# Patient Record
Sex: MALE | Race: WHITE
[De-identification: names, ages, dates, MRNs, and addresses within clinical notes are randomized per-mention and may not be internally consistent; named-entity substitution may affect disease eponyms.]

---

## 2021-02-17 PROBLEM — Z00.00 ENCOUNTER FOR PREVENTIVE HEALTH EXAMINATION: Status: ACTIVE | Noted: 2021-02-17

## 2021-03-05 ENCOUNTER — APPOINTMENT (OUTPATIENT)
Dept: COLORECTAL SURGERY | Facility: CLINIC | Age: 81
End: 2021-03-05

## 2022-07-12 ENCOUNTER — APPOINTMENT (OUTPATIENT)
Dept: ORTHOPEDIC SURGERY | Facility: CLINIC | Age: 82
End: 2022-07-12

## 2022-07-12 DIAGNOSIS — M25.562 PAIN IN LEFT KNEE: ICD-10-CM

## 2022-07-12 DIAGNOSIS — M17.12 UNILATERAL PRIMARY OSTEOARTHRITIS, LEFT KNEE: ICD-10-CM

## 2022-07-12 PROCEDURE — 99203 OFFICE O/P NEW LOW 30 MIN: CPT

## 2022-07-12 PROCEDURE — 73562 X-RAY EXAM OF KNEE 3: CPT | Mod: LT

## 2022-07-12 NOTE — PHYSICAL EXAM
[NL (0)] : extension 0 degrees [5___] : hamstring 5[unfilled]/5 [Equivocal] : equivocal Pablo [] : no atrophy [Left] : left knee [AP] : anteroposterior [Lateral] : lateral [Loa] : skyline [There are no fractures, subluxations or dislocations. No significant abnormalities are seen] : There are no fractures, subluxations or dislocations. No significant abnormalities are seen [Degenerative change] : Degenerative change [Mild tricompartmental OA medial narrowing] : Mild tricompartmental OA medial narrowing [[id number]] : flexion 120 degrees

## 2022-07-12 NOTE — HISTORY OF PRESENT ILLNESS
[Constant] : constant [de-identified] : C/o pain in the left knee. Problem started about 2 weeks ago. Patient has a history of meniscus surgery in 1991. He complains of pain and swelling. Patient complains of weakness and instability. Patient states he has constant pain, worse when lying down or standing. Patient icing as needed.

## 2022-07-12 NOTE — DISCUSSION/SUMMARY
[de-identified] : reviewed findings, anatomy and pathology  discussed and pt understands. questions answered, pt left pleased\par discuss possible adverse reaction to meds , recommend discuss with PMD

## 2023-06-21 ENCOUNTER — OFFICE (OUTPATIENT)
Dept: URBAN - METROPOLITAN AREA CLINIC 97 | Facility: CLINIC | Age: 83
Setting detail: OPHTHALMOLOGY
End: 2023-06-21
Payer: MEDICARE

## 2023-06-21 DIAGNOSIS — T15.02XA: ICD-10-CM

## 2023-06-21 DIAGNOSIS — H00.14: ICD-10-CM

## 2023-06-21 DIAGNOSIS — H11.32: ICD-10-CM

## 2023-06-21 PROCEDURE — 99213 OFFICE O/P EST LOW 20 MIN: CPT | Performed by: OPHTHALMOLOGY

## 2023-06-21 PROCEDURE — 65222 REMOVE FOREIGN BODY FROM EYE: CPT | Performed by: OPHTHALMOLOGY

## 2023-06-21 ASSESSMENT — REFRACTION_MANIFEST
OS_SPHERE: -1.00
OS_CYLINDER: +1.00
OS_VA1: 20/40+
OS_VA2: 20/20(J1+)
OD_VA2: 20/20(J1+)
OU_VA: 20/40+1
OD_SPHERE: -2.75
OD_CYLINDER: +1.25
OS_AXIS: 180
OS_AXIS: 090
OD_CYLINDER: -0.75
OS_SPHERE: -2.50
OS_CYLINDER: -0.75
OD_ADD: +2.25
OD_SPHERE: -3.00
OS_ADD: +2.25
OD_VA1: 20/50-
OD_AXIS: 090
OD_AXIS: 005

## 2023-06-21 ASSESSMENT — REFRACTION_CURRENTRX
OD_OVR_VA: 20/
OD_CYLINDER: +1.50
OS_SPHERE: -1.75
OD_VPRISM_DIRECTION: SV
OS_OVR_VA: 20/
OS_AXIS: 11
OD_CYLINDER: +1.25
OD_AXIS: 2
OS_VPRISM_DIRECTION: SV
OS_SPHERE: -3.00
OD_VPRISM_DIRECTION: SV
OD_AXIS: 1
OD_SPHERE: -2.50
OD_OVR_VA: 20/
OD_SPHERE: -3.25
OS_OVR_VA: 20/
OS_VPRISM_DIRECTION: SV
OS_AXIS: 177
OS_CYLINDER: +1.00
OS_CYLINDER: +1.25

## 2023-06-21 ASSESSMENT — KERATOMETRY
OD_AXISANGLE_DEGREES: 090
OS_K1POWER_DIOPTERS: 43.75
OD_K1POWER_DIOPTERS: 44.25
OS_K2POWER_DIOPTERS: 44.00
OD_K2POWER_DIOPTERS: 44.25
OS_AXISANGLE_DEGREES: 009

## 2023-06-21 ASSESSMENT — CORNEAL TRAUMA - FOREIGN BODY: OS_FOREIGNBODY: NON METALLIC PRESENT

## 2023-06-21 ASSESSMENT — AXIALLENGTH_DERIVED
OD_AL: 24.6792
OS_AL: 23.9967
OD_AL: 24.3117
OD_AL: 24.1576
OS_AL: 24.2513
OS_AL: 23.9967

## 2023-06-21 ASSESSMENT — REFRACTION_AUTOREFRACTION
OS_CYLINDER: -1.25
OS_AXIS: 087
OD_CYLINDER: -1.00
OD_AXIS: 090
OS_SPHERE: -0.75
OD_SPHERE: -2.00

## 2023-06-21 ASSESSMENT — SPHEQUIV_DERIVED
OD_SPHEQUIV: -2.5
OS_SPHEQUIV: -2
OD_SPHEQUIV: -3.375
OS_SPHEQUIV: -1.375
OD_SPHEQUIV: -2.125
OS_SPHEQUIV: -1.375

## 2023-06-21 ASSESSMENT — CORNEAL TRAUMA - ABRASION: OS_ABRASION: ABSENT

## 2023-06-21 ASSESSMENT — VISUAL ACUITY
OS_BCVA: 20/25+2
OD_BCVA: 20/25-

## 2023-06-21 ASSESSMENT — TONOMETRY
OD_IOP_MMHG: 14
OS_IOP_MMHG: 12

## 2023-06-21 ASSESSMENT — CONFRONTATIONAL VISUAL FIELD TEST (CVF)
OD_FINDINGS: FULL
OS_FINDINGS: FULL

## 2023-06-23 ENCOUNTER — OFFICE (OUTPATIENT)
Dept: URBAN - METROPOLITAN AREA CLINIC 8 | Facility: CLINIC | Age: 83
Setting detail: OPHTHALMOLOGY
End: 2023-06-23
Payer: MEDICARE

## 2023-06-23 ENCOUNTER — RX ONLY (RX ONLY)
Age: 83
End: 2023-06-23

## 2023-06-23 DIAGNOSIS — H00.14: ICD-10-CM

## 2023-06-23 DIAGNOSIS — H01.004: ICD-10-CM

## 2023-06-23 DIAGNOSIS — H11.32: ICD-10-CM

## 2023-06-23 DIAGNOSIS — H01.001: ICD-10-CM

## 2023-06-23 PROCEDURE — 99213 OFFICE O/P EST LOW 20 MIN: CPT

## 2023-06-23 ASSESSMENT — TONOMETRY
OD_IOP_MMHG: 15
OS_IOP_MMHG: 15

## 2023-06-23 ASSESSMENT — SPHEQUIV_DERIVED
OD_SPHEQUIV: -3.375
OD_SPHEQUIV: -2.125
OD_SPHEQUIV: -2.5
OS_SPHEQUIV: -2
OS_SPHEQUIV: -1.375
OS_SPHEQUIV: -1.375

## 2023-06-23 ASSESSMENT — REFRACTION_CURRENTRX
OS_AXIS: 177
OD_CYLINDER: +1.25
OS_SPHERE: -1.75
OS_CYLINDER: +1.25
OS_VPRISM_DIRECTION: SV
OD_VPRISM_DIRECTION: SV
OS_SPHERE: -3.00
OD_CYLINDER: +1.50
OD_OVR_VA: 20/
OS_VPRISM_DIRECTION: SV
OD_SPHERE: -3.25
OD_AXIS: 2
OD_SPHERE: -2.50
OD_AXIS: 1
OS_OVR_VA: 20/
OS_CYLINDER: +1.00
OD_OVR_VA: 20/
OS_OVR_VA: 20/
OS_AXIS: 11
OD_VPRISM_DIRECTION: SV

## 2023-06-23 ASSESSMENT — REFRACTION_MANIFEST
OS_VA2: 20/20(J1+)
OU_VA: 20/40+1
OD_AXIS: 005
OD_VA1: 20/50-
OS_CYLINDER: +1.00
OD_AXIS: 090
OS_ADD: +2.25
OD_CYLINDER: -0.75
OS_VA1: 20/40+
OS_CYLINDER: -0.75
OD_SPHERE: -3.00
OD_SPHERE: -2.75
OS_SPHERE: -2.50
OD_ADD: +2.25
OD_CYLINDER: +1.25
OS_SPHERE: -1.00
OS_AXIS: 090
OS_AXIS: 180
OD_VA2: 20/20(J1+)

## 2023-06-23 ASSESSMENT — REFRACTION_AUTOREFRACTION
OS_AXIS: 087
OS_CYLINDER: -1.25
OD_SPHERE: -2.00
OD_AXIS: 090
OD_CYLINDER: -1.00
OS_SPHERE: -0.75

## 2023-06-23 ASSESSMENT — KERATOMETRY
OD_K2POWER_DIOPTERS: 44.25
OD_AXISANGLE_DEGREES: 090
OD_K1POWER_DIOPTERS: 44.25
OS_AXISANGLE_DEGREES: 009
OS_K2POWER_DIOPTERS: 44.00
OS_K1POWER_DIOPTERS: 43.75

## 2023-06-23 ASSESSMENT — VISUAL ACUITY
OD_BCVA: 20/30+2
OS_BCVA: 20/30+

## 2023-06-23 ASSESSMENT — AXIALLENGTH_DERIVED
OS_AL: 24.2513
OD_AL: 24.6792
OD_AL: 24.1576
OD_AL: 24.3117
OS_AL: 23.9967
OS_AL: 23.9967

## 2023-06-23 ASSESSMENT — LID EXAM ASSESSMENTS
OD_BLEPHARITIS: RUL 2+
OS_BLEPHARITIS: LUL 2+

## 2023-06-23 ASSESSMENT — CONFRONTATIONAL VISUAL FIELD TEST (CVF)
OD_FINDINGS: FULL
OS_FINDINGS: FULL

## 2023-07-19 ENCOUNTER — OFFICE (OUTPATIENT)
Dept: URBAN - METROPOLITAN AREA CLINIC 97 | Facility: CLINIC | Age: 83
Setting detail: OPHTHALMOLOGY
End: 2023-07-19
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
## 2023-07-19 PROCEDURE — NO SHOW FE NO SHOW FEE: Performed by: OPHTHALMOLOGY

## 2023-07-28 ENCOUNTER — RX ONLY (RX ONLY)
Age: 83
End: 2023-07-28

## 2023-07-28 ENCOUNTER — OFFICE (OUTPATIENT)
Dept: URBAN - METROPOLITAN AREA CLINIC 8 | Facility: CLINIC | Age: 83
Setting detail: OPHTHALMOLOGY
End: 2023-07-28
Payer: MEDICARE

## 2023-07-28 DIAGNOSIS — H01.004: ICD-10-CM

## 2023-07-28 DIAGNOSIS — H00.11: ICD-10-CM

## 2023-07-28 DIAGNOSIS — H01.001: ICD-10-CM

## 2023-07-28 PROCEDURE — 99213 OFFICE O/P EST LOW 20 MIN: CPT

## 2023-07-28 ASSESSMENT — REFRACTION_AUTOREFRACTION
OD_SPHERE: -2.00
OS_AXIS: 087
OS_CYLINDER: -1.25
OD_AXIS: 090
OD_CYLINDER: -1.00
OS_SPHERE: -0.75

## 2023-07-28 ASSESSMENT — VISUAL ACUITY
OD_BCVA: 20/30
OS_BCVA: 20/30

## 2023-07-28 ASSESSMENT — REFRACTION_CURRENTRX
OD_SPHERE: -2.50
OD_AXIS: 1
OD_OVR_VA: 20/
OS_AXIS: 177
OS_OVR_VA: 20/
OD_CYLINDER: +1.25
OD_CYLINDER: +1.50
OD_AXIS: 2
OS_CYLINDER: +1.00
OD_OVR_VA: 20/
OS_CYLINDER: +1.25
OD_VPRISM_DIRECTION: SV
OS_OVR_VA: 20/
OS_VPRISM_DIRECTION: SV
OD_VPRISM_DIRECTION: SV
OS_SPHERE: -1.75
OD_SPHERE: -3.25
OS_AXIS: 11
OS_SPHERE: -3.00
OS_VPRISM_DIRECTION: SV

## 2023-07-28 ASSESSMENT — REFRACTION_MANIFEST
OS_VA1: 20/40+
OS_VA2: 20/20(J1+)
OD_VA2: 20/20(J1+)
OD_AXIS: 090
OD_SPHERE: -3.00
OS_SPHERE: -1.00
OD_CYLINDER: +1.25
OD_CYLINDER: -0.75
OS_SPHERE: -2.50
OU_VA: 20/40+1
OS_ADD: +2.25
OD_VA1: 20/50-
OS_AXIS: 180
OD_ADD: +2.25
OS_CYLINDER: +1.00
OS_AXIS: 090
OD_AXIS: 005
OS_CYLINDER: -0.75
OD_SPHERE: -2.75

## 2023-07-28 ASSESSMENT — KERATOMETRY
OS_AXISANGLE_DEGREES: 009
OD_K2POWER_DIOPTERS: 44.25
OD_K1POWER_DIOPTERS: 44.25
OD_AXISANGLE_DEGREES: 090
OS_K2POWER_DIOPTERS: 44.00
OS_K1POWER_DIOPTERS: 43.75

## 2023-07-28 ASSESSMENT — SPHEQUIV_DERIVED
OD_SPHEQUIV: -2.5
OS_SPHEQUIV: -1.375
OD_SPHEQUIV: -3.375
OD_SPHEQUIV: -2.125
OS_SPHEQUIV: -1.375
OS_SPHEQUIV: -2

## 2023-07-28 ASSESSMENT — LID EXAM ASSESSMENTS
OD_BLEPHARITIS: RUL 2+
OS_BLEPHARITIS: LUL 2+

## 2023-07-28 ASSESSMENT — AXIALLENGTH_DERIVED
OS_AL: 24.2513
OD_AL: 24.3117
OS_AL: 23.9967
OD_AL: 24.6792
OS_AL: 23.9967
OD_AL: 24.1576

## 2023-07-28 ASSESSMENT — CONFRONTATIONAL VISUAL FIELD TEST (CVF)
OD_FINDINGS: FULL
OS_FINDINGS: FULL

## 2024-06-27 ENCOUNTER — APPOINTMENT (OUTPATIENT)
Dept: ORTHOPEDIC SURGERY | Facility: CLINIC | Age: 84
End: 2024-06-27
Payer: MEDICARE

## 2024-06-27 DIAGNOSIS — S46.212A STRAIN OF MUSCLE, FASCIA AND TENDON OF OTHER PARTS OF BICEPS, LEFT ARM, INITIAL ENCOUNTER: ICD-10-CM

## 2024-06-27 DIAGNOSIS — Z78.9 OTHER SPECIFIED HEALTH STATUS: ICD-10-CM

## 2024-06-27 DIAGNOSIS — S46.012A STRAIN OF MUSCLE(S) AND TENDON(S) OF THE ROTATOR CUFF OF LEFT SHOULDER, INITIAL ENCOUNTER: ICD-10-CM

## 2024-06-27 DIAGNOSIS — C67.9 MALIGNANT NEOPLASM OF BLADDER, UNSPECIFIED: ICD-10-CM

## 2024-06-27 PROCEDURE — 99213 OFFICE O/P EST LOW 20 MIN: CPT

## 2024-06-27 PROCEDURE — 73030 X-RAY EXAM OF SHOULDER: CPT | Mod: LT

## 2024-07-17 ENCOUNTER — OFFICE (OUTPATIENT)
Dept: URBAN - METROPOLITAN AREA CLINIC 8 | Facility: CLINIC | Age: 84
Setting detail: OPHTHALMOLOGY
End: 2024-07-17
Payer: MEDICARE

## 2024-07-17 VITALS — HEIGHT: 60 IN

## 2024-07-17 DIAGNOSIS — H01.001: ICD-10-CM

## 2024-07-17 DIAGNOSIS — H26.491: ICD-10-CM

## 2024-07-17 DIAGNOSIS — H16.223: ICD-10-CM

## 2024-07-17 DIAGNOSIS — Z96.1: ICD-10-CM

## 2024-07-17 DIAGNOSIS — H01.004: ICD-10-CM

## 2024-07-17 PROCEDURE — 99213 OFFICE O/P EST LOW 20 MIN: CPT | Performed by: OPHTHALMOLOGY

## 2024-07-17 ASSESSMENT — CONFRONTATIONAL VISUAL FIELD TEST (CVF)
OS_FINDINGS: FULL
OD_FINDINGS: FULL

## 2024-07-17 ASSESSMENT — LID EXAM ASSESSMENTS
OD_BLEPHARITIS: RUL T
OS_BLEPHARITIS: LUL T

## 2024-07-18 ENCOUNTER — APPOINTMENT (OUTPATIENT)
Dept: NEUROLOGY | Facility: CLINIC | Age: 84
End: 2024-07-18

## 2024-07-18 VITALS
HEIGHT: 65 IN | OXYGEN SATURATION: 97 % | SYSTOLIC BLOOD PRESSURE: 156 MMHG | WEIGHT: 151 LBS | HEART RATE: 79 BPM | DIASTOLIC BLOOD PRESSURE: 118 MMHG | BODY MASS INDEX: 25.16 KG/M2

## 2024-07-18 VITALS — SYSTOLIC BLOOD PRESSURE: 145 MMHG | DIASTOLIC BLOOD PRESSURE: 110 MMHG

## 2024-07-18 VITALS — SYSTOLIC BLOOD PRESSURE: 169 MMHG | DIASTOLIC BLOOD PRESSURE: 117 MMHG

## 2024-07-18 DIAGNOSIS — I63.9 CEREBRAL INFARCTION, UNSPECIFIED: ICD-10-CM

## 2024-07-18 DIAGNOSIS — Z78.9 OTHER SPECIFIED HEALTH STATUS: ICD-10-CM

## 2024-07-18 PROCEDURE — 99205 OFFICE O/P NEW HI 60 MIN: CPT

## 2024-07-19 RX ORDER — EZETIMIBE 10 MG/1
10 TABLET ORAL
Refills: 0 | Status: ACTIVE | COMMUNITY

## 2024-07-19 RX ORDER — SIMVASTATIN 10 MG/1
10 TABLET, FILM COATED ORAL
Refills: 0 | Status: ACTIVE | COMMUNITY

## 2024-07-19 RX ORDER — ASPIRIN 81 MG
81 TABLET, DELAYED RELEASE (ENTERIC COATED) ORAL
Refills: 0 | Status: ACTIVE | COMMUNITY

## 2024-07-19 RX ORDER — ALENDRONATE SODIUM 70 MG/1
70 TABLET ORAL
Refills: 0 | Status: ACTIVE | COMMUNITY

## 2024-07-19 RX ORDER — LOSARTAN POTASSIUM 50 MG/1
50 TABLET, FILM COATED ORAL
Refills: 0 | Status: ACTIVE | COMMUNITY

## 2024-07-19 RX ORDER — PREDNISONE 10 MG
TABLET ORAL
Refills: 0 | Status: ACTIVE | COMMUNITY

## 2024-07-19 RX ORDER — CARVEDILOL 12.5 MG/1
12.5 TABLET, FILM COATED ORAL
Refills: 0 | Status: ACTIVE | COMMUNITY

## 2024-07-19 RX ORDER — LANSOPRAZOLE 15 MG/1
CAPSULE, DELAYED RELEASE ORAL
Refills: 0 | Status: ACTIVE | COMMUNITY

## 2024-07-19 RX ORDER — APIXABAN 5 MG/1
5 TABLET, FILM COATED ORAL
Refills: 0 | Status: ACTIVE | COMMUNITY

## 2024-07-29 ENCOUNTER — APPOINTMENT (OUTPATIENT)
Dept: ORTHOPEDIC SURGERY | Facility: CLINIC | Age: 84
End: 2024-07-29
Payer: MEDICARE

## 2024-07-29 DIAGNOSIS — Z95.0 PRESENCE OF CARDIAC PACEMAKER: ICD-10-CM

## 2024-07-29 DIAGNOSIS — S46.212A STRAIN OF MUSCLE, FASCIA AND TENDON OF OTHER PARTS OF BICEPS, LEFT ARM, INITIAL ENCOUNTER: ICD-10-CM

## 2024-07-29 PROCEDURE — 99213 OFFICE O/P EST LOW 20 MIN: CPT

## 2024-07-29 NOTE — PHYSICAL EXAM
[Normal Coordination] : normal coordination [Normal Sensation] : normal sensation [Normal Mood and Affect] : normal mood and affect [Oriented] : oriented [Able to Communicate] : able to communicate [Well Developed] : well developed [Well Nourished] : well nourished [NL (0-180)] : full active abduction 0-180 degrees [NL (0-70)] : full internal rotation 0-70 degrees [Sitting] : sitting [4 ___] : forward flexion 4[unfilled]/5 [3___] : external rotation 3[unfilled]/5 [4___] : internal rotation 4[unfilled]/5 [Left] : left shoulder [There are no fractures, subluxations or dislocations. No significant abnormalities are seen] : There are no fractures, subluxations or dislocations. No significant abnormalities are seen [Type 2 acromion] : Type 2 acromion [] : negative Donnell [FreeTextEntry1] : pacemaker visualized [de-identified] : external rotation 5 degrees

## 2024-07-29 NOTE — DISCUSSION/SUMMARY
[de-identified] : We discussed formal PT, a home exercise program, ice therapy and the role of NSAIDS for the pain. These modalities will improve the patient's functionality in their activities of daily life.  Risks, benefits and contraindications were discussed.  The patient will follow up in 6 weeks or sooner as needed.

## 2024-09-16 ENCOUNTER — APPOINTMENT (OUTPATIENT)
Dept: ORTHOPEDIC SURGERY | Facility: CLINIC | Age: 84
End: 2024-09-16
Payer: MEDICARE

## 2024-09-16 DIAGNOSIS — S46.212A STRAIN OF MUSCLE, FASCIA AND TENDON OF OTHER PARTS OF BICEPS, LEFT ARM, INITIAL ENCOUNTER: ICD-10-CM

## 2024-09-16 PROCEDURE — 99213 OFFICE O/P EST LOW 20 MIN: CPT

## 2024-09-16 NOTE — PHYSICAL EXAM
[Normal Coordination] : normal coordination [Normal Sensation] : normal sensation [Normal Mood and Affect] : normal mood and affect [Oriented] : oriented [Able to Communicate] : able to communicate [Well Developed] : well developed [Well Nourished] : well nourished [NL (0-180)] : full active abduction 0-180 degrees [NL (0-70)] : full internal rotation 0-70 degrees [Sitting] : sitting [4 ___] : forward flexion 4[unfilled]/5 [3___] : external rotation 3[unfilled]/5 [4___] : internal rotation 4[unfilled]/5 [Left] : left shoulder [There are no fractures, subluxations or dislocations. No significant abnormalities are seen] : There are no fractures, subluxations or dislocations. No significant abnormalities are seen [Type 2 acromion] : Type 2 acromion [] : negative Donnell [FreeTextEntry1] : pacemaker visualized [de-identified] : external rotation 5 degrees

## 2024-09-16 NOTE — DISCUSSION/SUMMARY
[de-identified] : We discussed continued formal PT, a home exercise program, ice therapy and the role of NSAIDS for the pain. These modalities will improve the patient's functionality in their activities of daily life.  Risks, benefits and contraindications were discussed.  The patient will follow up in 6 weeks or sooner as needed.

## 2025-04-14 ENCOUNTER — APPOINTMENT (OUTPATIENT)
Dept: ORTHOPEDIC SURGERY | Facility: CLINIC | Age: 85
End: 2025-04-14
Payer: MEDICARE

## 2025-04-14 DIAGNOSIS — S70.01XA CONTUSION OF RIGHT HIP, INITIAL ENCOUNTER: ICD-10-CM

## 2025-04-14 PROCEDURE — 73552 X-RAY EXAM OF FEMUR 2/>: CPT | Mod: RT

## 2025-04-14 PROCEDURE — 99214 OFFICE O/P EST MOD 30 MIN: CPT

## 2025-04-30 ENCOUNTER — APPOINTMENT (OUTPATIENT)
Dept: ORTHOPEDIC SURGERY | Facility: CLINIC | Age: 85
End: 2025-04-30

## 2025-07-09 ENCOUNTER — OFFICE (OUTPATIENT)
Dept: URBAN - METROPOLITAN AREA CLINIC 97 | Facility: CLINIC | Age: 85
Setting detail: OPHTHALMOLOGY
End: 2025-07-09
Payer: MEDICARE

## 2025-07-09 VITALS — HEIGHT: 60 IN

## 2025-07-09 DIAGNOSIS — H01.004: ICD-10-CM

## 2025-07-09 DIAGNOSIS — H26.491: ICD-10-CM

## 2025-07-09 DIAGNOSIS — H35.372: ICD-10-CM

## 2025-07-09 DIAGNOSIS — H01.001: ICD-10-CM

## 2025-07-09 PROBLEM — B88.0 OTHER ACARIASIS: Status: ACTIVE | Noted: 2025-07-09

## 2025-07-09 PROBLEM — H35.40 PERIPAPILLARY ATROPHY ; RIGHT EYE: Status: ACTIVE | Noted: 2025-07-09

## 2025-07-09 PROBLEM — I63.50 STROKE/CEREBRAL ACCIDENT: Status: ACTIVE | Noted: 2025-07-09

## 2025-07-09 PROCEDURE — 92014 COMPRE OPH EXAM EST PT 1/>: CPT | Performed by: OPTOMETRIST

## 2025-07-09 PROCEDURE — 92134 CPTRZ OPH DX IMG PST SGM RTA: CPT | Performed by: OPTOMETRIST

## 2025-07-09 ASSESSMENT — KERATOMETRY
OS_K2POWER_DIOPTERS: 44.25
OD_AXISANGLE_DEGREES: 155
OD_K1POWER_DIOPTERS: 43.75
OS_AXISANGLE_DEGREES: 063
OD_K2POWER_DIOPTERS: 44.00
OS_K1POWER_DIOPTERS: 44.00
METHOD_AUTO_MANUAL: AUTO

## 2025-07-09 ASSESSMENT — REFRACTION_CURRENTRX
OD_OVR_VA: 20/
OD_SPHERE: -3.25
OS_CYLINDER: +1.25
OD_AXIS: 1
OD_VPRISM_DIRECTION: SV
OD_AXIS: 2
OS_SPHERE: -3.00
OD_OVR_VA: 20/
OS_OVR_VA: 20/
OS_OVR_VA: 20/
OS_CYLINDER: +1.00
OD_SPHERE: -2.50
OD_CYLINDER: +1.25
OS_AXIS: 177
OS_VPRISM_DIRECTION: SV
OD_VPRISM_DIRECTION: SV
OS_AXIS: 11
OS_VPRISM_DIRECTION: SV
OS_SPHERE: -1.75
OD_CYLINDER: +1.50

## 2025-07-09 ASSESSMENT — VISUAL ACUITY
OS_BCVA: 20/30
OD_BCVA: 20/25

## 2025-07-09 ASSESSMENT — CONFRONTATIONAL VISUAL FIELD TEST (CVF)
OD_FINDINGS: FULL
OS_FINDINGS: FULL

## 2025-07-09 ASSESSMENT — REFRACTION_MANIFEST
OS_CYLINDER: +1.00
OS_SPHERE: -2.50
OD_AXIS: 005
OD_SPHERE: -2.75
OD_CYLINDER: +1.25
OD_VA2: 20/20(J1+)
OS_VA2: 20/20(J1+)
OS_AXIS: 180

## 2025-07-09 ASSESSMENT — REFRACTION_AUTOREFRACTION
OS_AXIS: 081
OD_SPHERE: -0.50
OD_CYLINDER: +0.75
OS_CYLINDER: +0.75
OD_AXIS: 175
OS_SPHERE: -0.25

## 2025-07-09 ASSESSMENT — LID EXAM ASSESSMENTS
OD_BLEPHARITIS: RUL 1+
OS_BLEPHARITIS: LUL 1+

## 2025-08-07 ENCOUNTER — RX ONLY (RX ONLY)
Age: 85
End: 2025-08-07

## 2025-08-07 ENCOUNTER — OFFICE (OUTPATIENT)
Dept: URBAN - METROPOLITAN AREA CLINIC 97 | Facility: CLINIC | Age: 85
Setting detail: OPHTHALMOLOGY
End: 2025-08-07
Payer: MEDICARE

## 2025-08-07 DIAGNOSIS — H11.31: ICD-10-CM

## 2025-08-07 PROCEDURE — 99212 OFFICE O/P EST SF 10 MIN: CPT | Performed by: OPHTHALMOLOGY

## 2025-08-07 ASSESSMENT — REFRACTION_AUTOREFRACTION
OS_SPHERE: -0.25
OD_AXIS: 175
OS_CYLINDER: +0.75
OS_AXIS: 081
OD_CYLINDER: +0.75
OD_SPHERE: -0.50

## 2025-08-07 ASSESSMENT — KERATOMETRY
METHOD_AUTO_MANUAL: AUTO
OD_K1POWER_DIOPTERS: 43.75
OD_K2POWER_DIOPTERS: 44.00
OS_AXISANGLE_DEGREES: 063
OD_AXISANGLE_DEGREES: 155
OS_K1POWER_DIOPTERS: 44.00
OS_K2POWER_DIOPTERS: 44.25

## 2025-08-07 ASSESSMENT — REFRACTION_CURRENTRX
OS_OVR_VA: 20/
OD_VPRISM_DIRECTION: SV
OD_CYLINDER: +1.50
OS_OVR_VA: 20/
OS_CYLINDER: +1.00
OD_VPRISM_DIRECTION: SV
OS_CYLINDER: +1.25
OS_VPRISM_DIRECTION: SV
OS_SPHERE: -1.75
OD_SPHERE: -2.50
OD_OVR_VA: 20/
OS_SPHERE: -3.00
OD_AXIS: 1
OD_SPHERE: -3.25
OD_AXIS: 2
OD_CYLINDER: +1.25
OS_AXIS: 177
OS_VPRISM_DIRECTION: SV
OS_AXIS: 11
OD_OVR_VA: 20/

## 2025-08-07 ASSESSMENT — REFRACTION_MANIFEST
OS_VA2: 20/20(J1+)
OS_SPHERE: -2.50
OD_SPHERE: -2.75
OS_AXIS: 180
OD_AXIS: 005
OD_CYLINDER: +1.25
OS_CYLINDER: +1.00
OD_VA2: 20/20(J1+)

## 2025-08-07 ASSESSMENT — CONFRONTATIONAL VISUAL FIELD TEST (CVF)
OS_FINDINGS: FULL
OD_FINDINGS: FULL

## 2025-08-07 ASSESSMENT — LID EXAM ASSESSMENTS
OD_BLEPHARITIS: RUL 1+
OS_BLEPHARITIS: LUL 1+

## 2025-08-07 ASSESSMENT — VISUAL ACUITY
OS_BCVA: 20/30
OD_BCVA: 20/20-